# Patient Record
Sex: MALE | Race: WHITE | URBAN - METROPOLITAN AREA
[De-identification: names, ages, dates, MRNs, and addresses within clinical notes are randomized per-mention and may not be internally consistent; named-entity substitution may affect disease eponyms.]

---

## 2017-07-03 ENCOUNTER — OFFICE VISIT (OUTPATIENT)
Dept: DERMATOLOGY | Facility: CLINIC | Age: 57
End: 2017-07-03
Payer: COMMERCIAL

## 2017-07-03 DIAGNOSIS — D22.9 MULTIPLE NEVI: ICD-10-CM

## 2017-07-03 DIAGNOSIS — B07.8 COMMON WART: ICD-10-CM

## 2017-07-03 DIAGNOSIS — L82.1 SEBORRHEIC KERATOSES: ICD-10-CM

## 2017-07-03 DIAGNOSIS — D18.01 CHERRY ANGIOMA: Primary | ICD-10-CM

## 2017-07-03 PROCEDURE — 99203 OFFICE O/P NEW LOW 30 MIN: CPT | Mod: 25 | Performed by: DERMATOLOGY

## 2017-07-03 PROCEDURE — 17110 DESTRUCTION B9 LES UP TO 14: CPT | Performed by: DERMATOLOGY

## 2017-07-03 NOTE — MR AVS SNAPSHOT
After Visit Summary   7/3/2017    Kevin Rivera    MRN: [id number]           Patient Information     Date Of Birth          1960        Visit Information        Provider Department      7/3/2017 8:30 AM Mao Brown MD [facility]        Today's Diagnoses     Cherry angioma    -  1    Multiple nevi        Seborrheic keratoses        Common wart          Care Instructions    Cryotherapy    What is it?    Use of a very cold liquid, such as liquid nitrogen, to freeze and destroy abnormal skin cells that need to be removed    What should I expect?    Tenderness and redness    A small blister that might grow and fill with dark purple blood. There may be crusting.    More than one treatment may be needed if the lesions do not go away.    How do I care for the treated area?    Gently wash the area with your hands when bathing.    Use a thin layer of Vaseline to help with healing. You may use a Band-Aid.     The area should heal within 7-10 days and may leave behind a pink or lighter color.     Do not use an antibiotic or Neosporin ointment.     You may take acetaminophen (Tylenol) for pain.     Call your Doctor if you have:    Severe pain    Signs of infection (warmth, redness, cloudy yellow drainage, and or a bad smell)    Questions or concerns    Who should I call with questions?       [facility]: [phone]       [facility]: [phone]       For urgent needs outside of business hours call the [facility] at [phone]        and ask for the dermatology resident on call    ___________________________________________________________________________    If you have an urgent skin problem while Dr. Beatty is on maternity leave you may be seen by Dr. More at [facility] or at the [facility].     They are able to see your current dermatology medical record.        Derik  [facility]  [address] [place] [address]  [phone]    [facility]    [address] [place] [address]  [phone]            Follow-ups after your visit        Who to contact     If you have questions or need follow up information about today's clinic visit or your schedule please contact [facility] directly at [phone].  Normal or non-critical lab and imaging results will be communicated to you by Stars Express[employer], letter or phone within 4 business days after the clinic has received the results. If you do not hear from us within 7 days, please contact the clinic through MyChart or phone. If you have a critical or abnormal lab result, we will notify you by phone as soon as possible.  Submit refill requests through Inst[employer]ank or call your pharmacy and they will forward the refill request to us. Please allow 3 business days for your refill to be completed.          Additional Information About Your Visit        Instabank Information     Instabank is an electronic gateway that provides easy, online access to your medical records. With Instabank, you can request a clinic appointment, read your test results, renew a prescription or communicate with your care team.     To sign up for Inst[employer]ank visit the website at [email or web address]   You will be asked to enter the access code listed below, as well as some personal information. Please follow the directions to create your username and password.     Your access code is: [email or web address]  Expires: 10/1/2017  9:34 AM     Your access code will  in 90 days. If you need help or a new code, please contact your [facility] or call [phone] for assistance.        Care EveryWhere ID     This is your Care EveryWhere ID. This could be used by other organizations to access your [place] medical records  [id number]         Blood Pressure from  Last 3 Encounters:   No data found for BP    Weight from Last 3 Encounters:   No data found for Wt              We Performed the Following     DESTRUCT BENIGN LESION, UP TO 14        Primary Care Provider Office Phone # Fax #    Lee Perales [phone]       [address]AKTA  [facility] [address]        Equal Access to Services     : Hadii aad ku hadasho Soomaali, waaxda luqadaha, qaybta kaalmada adeegyada, waxay idiin hayaan adeeg kharakami ladominic . So [facility] [phone].    ATENCIÓN: Si habla español, tiene a bartlett disposición servicios gratuitos de asistencia lingüística. Llame al [phone].    We comply with applicable federal civil rights laws and [place] laws. We do not discriminate on the basis of race, color, national origin, age, disability sex, sexual orientation or gender identity.            Thank you!     Thank you for choosing [facility] [[email or web address]]  for your care. Our goal is always to provide you with excellent care. Hearing back from our patients is one way we can continue to improve our services. Please take a few minutes to complete the written survey that you may receive in the mail after your visit with us. Thank you!             Your Updated Medication List - Protect others around you: Learn how to safely use, store and throw away your medicines at www.disposemymeds.org.      Notice  As of 7/3/2017  9:34 AM    You have not been prescribed any medications.

## 2017-07-03 NOTE — PROGRESS NOTES
"[facility] Dermatology Note      Dermatology Problem List:  1) Wart vs scar vs callus vs tendon contracture. Cryo, moisturizer, stop cutting/soldering iron to the area.      Last TBSE: 7/3/2017    Encounter Date: Jul 3, 2017    CC:  Chief Complaint   Patient presents with     Derm Problem     skin check, growth on LT pointer finger       History of Present Illness:  Mr. Kevin Rivera is a 57 year old male who presents for a skin check and growth of the left index finger. Pt has a family hx of BCC in mother and possible melanoma in sister. The lesion on his left finger has been present for about 2 years. Pt has tried filing the lesion down and burning the lesion with a sodering iron. Last home tx 3 days ago. No pain or pruritus. Pt denies any injury to the area. PT is right-handed.     Pt reports a lesion on his back that will \"grow-out\" and will bleed when he bumps it. There is nothing in the area right now. No other lesions of concern.    Past Medical History:   There is no problem list on file for this patient.    History reviewed. No pertinent past medical history.  History reviewed. No pertinent surgical history.    Social History:  The patient works as an . The patient denies use of tanning beds.    Family History:  There is a family history of non-melanoma skin cancer in the patient's mother. There is a possible family hx of melanoma in pt's sister.    Medications:  No current outpatient prescriptions on file.       Not on File    Review of Systems:  -Skin/Heme New Pt: The patient denies frequent sun exposure. The patient denies excessive scarring or problems healing except as per HPI. The patient denies excessive bleeding.  -Constitutional: The patient denies fatigue, fevers, chills, unintended weight loss, and night sweats.    Physical exam:  Vitals: There were no vitals taken for this visit.  GEN: This is a well developed, well-nourished male in no acute distress, in a pleasant " mood.    NEURO: Alert and oriented  PSYCH: In pleasant mood, appropriate affect  SKIN: Total skin excluding the undergarment areas was performed. The exam included the head/face, neck, both arms, chest, back, abdomen, both legs, digits and/or nails.   -on the 2nd digit overlying the PIP joint is a thick papule without scale. Skin lines are disturbed. There is no contracture.  -Multiple regular brown pigmented macules and papules are identified on examined surfaces.   -There are bright red some shaped papules scattered on examined surfaces.   -There are waxy stuck on tan to brown papules on examined surfaces.  -No other lesions of concern on areas examined.       Impression/Plan:  1. Common wart, left index finger most consistent but given that constant and significant trauma, it is difficult to really differentiate between that and something like a callus or even an early contracture. Will treat as if wart and encourage frequent moisturization and no traumatic home treatments to the area.    Cryotherapy procedure note: After verbal consent and discussion of risks and benefits including but no limited to dyspigmentation/scar, blister, and pain, 1 was treated with 1-2mm freeze border for 2 cycles with liquid nitrogen. Post cryotherapy instructions were provided.     Emphasized importance of emollients, Gold Bond Rough and Bumpy sample provided      2. Cherry angiomas, Multiple clinically benign nevi, and Seborrheic keratosis, non irritated    No further intervention required. Patient to report changes.     Sun precaution was advised including the use of sun screens of SPF 30 or higher, sun protective clothing, and avoidance of tanning beds.    CC Lee Perales MD on close of this encounter.  Follow-up in 1 year, earlier for new or changing lesions.       Staff Involved:  Scribe/Staff    Scribe Disclosure:   Han DE LA CRUZ, am serving as a scribe to document services personally performed by Dr. Mao Brown, based on  data collection and the provider's statements to me.     Provider Disclosure:   I have reviewed the documentation recorded by the scribe and have edited it as needed. I have personally performed the services documented here and the documentation accurately represents those services and the decisions made by me.     Mao Brown MD, MS    Department of Dermatology  [facility]: Phone: [phone], Fax:[phone]  [facility]: Phone: [phone], Fax: [phone]

## 2017-07-03 NOTE — LETTER
"7/3/2017       RE: Kevin Rivera  [address] [place] [facility] [address]     Dear Colleague,    Thank you for referring your patient, Kevin Rivera, to the [facility] at [facility]. Please see a copy of my visit note below.    [facility] Dermatology Note      Dermatology Problem List:  1) Wart vs scar vs callus vs tendon contracture. Cryo, moisturizer, stop cutting/soldering iron to the area.      Last TBSE: 7/3/2017    Encounter Date: Jul 3, 2017    CC:  Chief Complaint   Patient presents with     Derm Problem     skin check, growth on LT pointer finger       History of Present Illness:  Mr. Kevin Rivera is a 57 year old male who presents for a skin check and growth of the left index finger. Pt has a family hx of BCC in mother and possible melanoma in sister. The lesion on his left finger has been present for about 2 years. Pt has tried filing the lesion down and burning the lesion with a sodering iron. Last home tx 3 days ago. No pain or pruritus. Pt denies any injury to the area. PT is right-handed.     Pt reports a lesion on his back that will \"grow-out\" and will bleed when he bumps it. There is nothing in the area right now. No other lesions of concern.    Past Medical History:   There is no problem list on file for this patient.    History reviewed. No pertinent past medical history.  History reviewed. No pertinent surgical history.    Social History:  The patient works as an . The patient denies use of tanning beds.    Family History:  There is a family history of non-melanoma skin cancer in the patient's mother. There is a possible family hx of melanoma in pt's sister.    Medications:  No current outpatient prescriptions on file.       Not on File    Review of Systems:  -Skin/Heme New Pt: The patient denies frequent sun exposure. The patient denies excessive scarring or problems healing except as per HPI. The patient " denies excessive bleeding.  -Constitutional: The patient denies fatigue, fevers, chills, unintended weight loss, and night sweats.    Physical exam:  Vitals: There were no vitals taken for this visit.  GEN: This is a well developed, well-nourished male in no acute distress, in a pleasant mood.    NEURO: Alert and oriented  PSYCH: In pleasant mood, appropriate affect  SKIN: Total skin excluding the undergarment areas was performed. The exam included the head/face, neck, both arms, chest, back, abdomen, both legs, digits and/or nails.   -on the 2nd digit overlying the PIP joint is a thick papule without scale. Skin lines are disturbed. There is no contracture.  -Multiple regular brown pigmented macules and papules are identified on examined surfaces.   -There are bright red some shaped papules scattered on examined surfaces.   -There are waxy stuck on tan to brown papules on examined surfaces.  -No other lesions of concern on areas examined.       Impression/Plan:  1. Common wart, left index finger most consistent but given that constant and significant trauma, it is difficult to really differentiate between that and something like a callus or even an early contracture. Will treat as if wart and encourage frequent moisturization and no traumatic home treatments to the area.    Cryotherapy procedure note: After verbal consent and discussion of risks and benefits including but no limited to dyspigmentation/scar, blister, and pain, 1 was treated with 1-2mm freeze border for 2 cycles with liquid nitrogen. Post cryotherapy instructions were provided.     Emphasized importance of emollients, Gold Bond Rough and Bumpy sample provided      2. Cherry angiomas, Multiple clinically benign nevi, and Seborrheic keratosis, non irritated    No further intervention required. Patient to report changes.     Sun precaution was advised including the use of sun screens of SPF 30 or higher, sun protective clothing, and avoidance of tanning  beds.    CC Lee Perales MD on close of this encounter.  Follow-up in 1 year, earlier for new or changing lesions.       Staff Involved:  Scribe/Staff    Scribe Disclosure:   I, Han Hiral, am serving as a scribe to document services personally performed by Dr. Mao Brown, based on data collection and the provider's statements to me.     Provider Disclosure:   I have reviewed the documentation recorded by the scribe and have edited it as needed. I have personally performed the services documented here and the documentation accurately represents those services and the decisions made by me.     Mao Brown MD, MS    Department of Dermatology  [facility]: Phone: [phone], Fax:[phone]  [facility] [place]: Phone: [phone], Fax: [phone]          Again, thank you for allowing me to participate in the care of your patient.      Sincerely,    Mao Brown MD

## 2017-07-03 NOTE — NURSING NOTE
Dermatology Rooming Note    Kevin Rivera's goals for this visit include:   Chief Complaint   Patient presents with     Derm Problem     skin check, growth on LT pointer finger       Is a scribe okay for this visit:YES    Are records needed for this visit(If yes, obtain release of information): Not applicable     Vitals: There were no vitals taken for this visit.    Referring Provider:  No referring provider defined for this encounter.

## 2017-07-03 NOTE — PATIENT INSTRUCTIONS
Cryotherapy    What is it?    Use of a very cold liquid, such as liquid nitrogen, to freeze and destroy abnormal skin cells that need to be removed    What should I expect?    Tenderness and redness    A small blister that might grow and fill with dark purple blood. There may be crusting.    More than one treatment may be needed if the lesions do not go away.    How do I care for the treated area?    Gently wash the area with your hands when bathing.    Use a thin layer of Vaseline to help with healing. You may use a Band-Aid.     The area should heal within 7-10 days and may leave behind a pink or lighter color.     Do not use an antibiotic or Neosporin ointment.     You may take acetaminophen (Tylenol) for pain.     Call your Doctor if you have:    Severe pain    Signs of infection (warmth, redness, cloudy yellow drainage, and or a bad smell)    Questions or concerns    Who should I call with questions?       [facility]: [phone]       [facility]: [phone]       For urgent needs outside of business hours call the [facility] at [phone]        and ask for the dermatology resident on call    ___________________________________________________________________________    If you have an urgent skin problem while Dr. Beatty is on maternity leave you may be seen by Dr. More at [facility] or at the [facility].     They are able to see your current dermatology medical record.      Dr. More  [facility]  [address] [place] [address] [phone]    [facility]    [address] [place] [id number] [phone]